# Patient Record
Sex: FEMALE | Race: WHITE | NOT HISPANIC OR LATINO | URBAN - METROPOLITAN AREA
[De-identification: names, ages, dates, MRNs, and addresses within clinical notes are randomized per-mention and may not be internally consistent; named-entity substitution may affect disease eponyms.]

---

## 2017-07-05 ENCOUNTER — OFFICE VISIT (OUTPATIENT)
Dept: NEUROLOGY | Facility: MEDICAL CENTER | Age: 27
End: 2017-07-05
Payer: COMMERCIAL

## 2017-07-05 VITALS
DIASTOLIC BLOOD PRESSURE: 58 MMHG | RESPIRATION RATE: 18 BRPM | SYSTOLIC BLOOD PRESSURE: 118 MMHG | HEART RATE: 99 BPM | OXYGEN SATURATION: 96 % | WEIGHT: 132 LBS | TEMPERATURE: 98.7 F

## 2017-07-05 DIAGNOSIS — G43.119 INTRACTABLE MIGRAINE WITH AURA WITHOUT STATUS MIGRAINOSUS: Primary | ICD-10-CM

## 2017-07-05 PROCEDURE — 99244 OFF/OP CNSLTJ NEW/EST MOD 40: CPT | Performed by: PSYCHIATRY & NEUROLOGY

## 2017-07-05 RX ORDER — METOCLOPRAMIDE 10 MG/1
TABLET ORAL
Qty: 45 TAB | Refills: 1 | Status: SHIPPED | OUTPATIENT
Start: 2017-07-05

## 2017-07-05 RX ORDER — SUMATRIPTAN 20 MG/1
SPRAY NASAL
Qty: 6 EACH | Refills: 1 | Status: SHIPPED | OUTPATIENT
Start: 2017-07-05

## 2017-07-05 RX ORDER — IBUPROFEN 800 MG/1
800 TABLET ORAL EVERY 8 HOURS PRN
COMMUNITY

## 2017-07-05 ASSESSMENT — ENCOUNTER SYMPTOMS
DEPRESSION: 0
SENSORY CHANGE: 0
TINGLING: 0
FOCAL WEAKNESS: 0
NECK PAIN: 0
MEMORY LOSS: 0
DOUBLE VISION: 0
HEADACHES: 1
INSOMNIA: 0

## 2017-07-05 ASSESSMENT — PAIN SCALES - GENERAL: PAINLEVEL: 2=MINIMAL-SLIGHT

## 2017-07-05 NOTE — MR AVS SNAPSHOT
Gilda Davis   2017 2:40 PM   Office Visit   MRN: [id number]    Department:  [facility]   Dept Phone:  [phone]    Description:  Female : 1990   Provider:  Jose Nolasco M.D.           Reason for Visit     New Patient headaches      Allergies as of 2017     Allergen Noted Reactions    Benzoyl Peroxide 2017       Rash      You were diagnosed with     Intractable migraine with aura without status migrainosus   [[id number]]  -  Primary       Vital Signs     Blood Pressure Pulse Temperature Respirations Weight Oxygen Saturation    118/58 mmHg 99 37.1 °C (98.7 °F) 18 59.875 kg (132 lb) 96%      Basic Information     Date Of Birth Sex Race Ethnicity Preferred Language    1990 Female White Non- English      Problem List              ICD-10-CM Priority Class Noted - Resolved    Intractable migraine with aura without status migrainosus G43.119   2017 - Present      Health Maintenance     Patient has no pending health maintenance at this time      Current Immunizations     No immunizations on file.      Below and/or attached are the medications your provider expects you to take. Review all of your home medications and newly ordered medications with your provider and/or pharmacist. Follow medication instructions as directed by your provider and/or pharmacist. Please keep your medication list with you and share with your provider. Update the information when medications are discontinued, doses are changed, or new medications (including over-the-counter products) are added; and carry medication information at all times in the event of emergency situations     Allergies:  BENZOYL PEROXIDE - (reactions not documented)               Medications  Valid as of: 2017 -  3:52 PM    Generic Name Brand Name Tablet Size Instructions for use    Ibuprofen (Tab) MOTRIN 800 MG Take 800 mg by mouth every 8 hours as needed.        Metoclopramide HCl (Tab) REGLAN 10 MG 1-3 tab at  headache/nausea onset; repeat in 4-6 hours prn        SUMAtriptan (Solution) IMITREX 20 MG/ACT First spray at headache pain onset; repeat in 1 hour prn.        .                 Medicines prescribed today were sent to:     [id number] - [place], [place] - [address]    [address] [place] [address]    Phone: [phone] Fax: [phone]    Open 24 Hours?: No      Medication refill instructions:       If your prescription bottle indicates you have medication refills left, it is not necessary to call your provider’s office. Please contact your pharmacy and they will refill your medication.    If your prescription bottle indicates you do not have any refills left, you may request refills at any time through one of the following ways: The online Smart GPS Backpack system (except Urgent Care), by calling your provider’s office, or by asking your pharmacy to contact your provider’s office with a refill request. Medication refills are processed only during regular business hours and may not be available until the next business day. Your provider may request additional information or to have a follow-up visit with you prior to refilling your medication.   *Please Note: Medication refills are assigned a new Rx number when refilled electronically. Your pharmacy may indicate that no refills were authorized even though a new prescription for the same medication is available at the pharmacy. Please request the medicine by name with the pharmacy before contacting your provider for a refill.           Smart GPS Backpack Access Code: [email or web address]  Expires: 8/4/2017  3:52 PM    Your email address is not on file at [facility].  Email Addresses are required for you to sign up for Smart GPS Backpack, please contact [phone] to verify your personal information and to provide your email address prior to attempting to register for Smart GPS Backpack.    [place] [address]    Smart GPS Backpack  A secure, online tool to manage your health  information     [employer] Puuilo® is a secure, online tool that connects you to your personalized health information from the privacy of your home -- day or night - making it very easy for you to manage your healthcare. Once the activation process is completed, you can even access your medical information using the Flooredt arthur, which is available for free in the Apple Arthur store or Google Play store.     To learn more about Puuilo, visit [email or web address]    There are two levels of access available (as shown below):   My Chart Features  [facility] Primary Care Doctor [facility]  Specialists [facility]  Urgent  Care [facility] Primary Care Doctor   Email your healthcare team securely and privately 24/7 X X X    Manage appointments: schedule your next appointment; view details of past/upcoming appointments X      Request prescription refills. X      View recent personal medical records, including lab and immunizations X X X X   View health record, including health history, allergies, medications X X X X   Read reports about your outpatient visits, procedures, consult and ER notes X X X X   See your discharge summary, which is a recap of your hospital and/or ER visit that includes your diagnosis, lab results, and care plan X X  X     How to register for Puuilo:  Once your e-mail address has been verified, follow the following steps to sign up for Puuilo.     1. Go to  [email or web address]  2. Click on the Sign Up Now box, which takes you to the New Member Sign Up page. You will need to provide the following information:  a. Enter your Puuilo Access Code exactly as it appears at the top of this page. (You will not need to use this code after you’ve completed the sign-up process. If you do not sign up before the expiration date, you must request a new code.)   b. Enter your date of birth.   c. Enter your home email address.   d. Click Submit, and follow the next screen’s instructions.  3. Create a Puuilo ID.  This will be your WeShow login ID and cannot be changed, so think of one that is secure and easy to remember.  4. Create a WeShow password. You can change your password at any time.  5. Enter your Password Reset Question and Answer. This can be used at a later time if you forget your password.   6. Enter your e-mail address. This allows you to receive e-mail notifications when new information is available in WeShow.  7. Click Sign Up. You can now view your health information.    For assistance activating your WeShow account, call [phone]

## 2017-07-06 NOTE — PROGRESS NOTES
Subjective:      Gilda Davis is a 27 y.o. female who presents with her  Maxwell, and her 8-month-old Javad, for consultation from the office of Dr. Christopher, with a history of migraine with aura.     HPI    Gilda is a pleasant 27 right-handed  female whose headaches started at about 13 years of age, and which have become a bit more problematic over the last several months.    Prodrome: None    Aura: Typically visual, lasting less than 10 minutes, she describes scintillations and photopsias. On one occasion recently, with this she actually had left-sided paresthesias and weakness as well as right hand tingling and limited mobility. Still, the symptoms lasted less than 10 minutes.    Headache: Unilateral, typically behind the eyes, there is nausea early on, heightened sensitivities, impaired concentration, as well as mild scalp hypersensitivity. There are no autonomic symptoms, she does not describe neck pain and stiffness.    Duration: Typically 2 days, these have lasted a little longer more recently.    Frequency: At her best they would occur once a month, typically menstrual, nowadays she is having upwards of at least 2 headache attacks on a weekly basis, giving her more than 20 days/month of pain, going on for at least 5 months.    Onset: Towards the end of the day    Start: Pre-menarche, maybe about 13 years of age    Triggers: Always 1-2 days prior to menstrual flow, she has not been able to identify other triggers. On birth control and the past, there was no change in the headaches.    Workup: None has been done to date    Treatment: Motrin 800 mg or Anaprox do provide good benefit. Given Imitrex, Maxalt and then Zomig tablets, these never provided relief, but she was also already nauseated and was vomiting when they were taken. She is presently on magnesium tablets. She and her  are considering another pregnancy, so she has real concerns about what to use moving forwards. She has never been on  maintenance therapies.    She has a history of exercised-induced asthma, her migraine headaches, otherwise there is no history of CAD, CVA, PVD, malignancy, glaucoma, kidney stones, liver or kidney disease, asthma, insomnia, psychiatric disease, blood dyscrasia or rheumatoid disease. There is no surgical history of note. Her menses are typically regular, occurring once a month with a 5 day duration. Her mother and everyone in her mother's family suffers from severe headaches, as do all of her 6 siblings. Her maternal grandmother had breast cancer, her father has hypertension. She does not smoke or drink. 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 Other than Motrin, she is on no other medications except prenatal vitamins.    Review of Systems   Constitutional: Negative for malaise/fatigue.   Eyes: Negative for double vision.   Musculoskeletal: Negative for neck pain.   Neurological: Positive for headaches. Negative for tingling, sensory change and focal weakness.   Psychiatric/Behavioral: Negative for depression and memory loss. The patient does not have insomnia.    All other systems reviewed and are negative.       Objective:     /58 mmHg  Pulse 99  Temp(Src) 37.1 °C (98.7 °F)  Resp 18  Wt 59.875 kg (132 lb)  SpO2 96%     Physical Exam    She appears in no acute distress. Her vital signs are stable. There is no malar rash, temporal or jaw tenderness, jaw claudication, or allodynia. The neck is supple, range of motion is full, carotid pulses are present bilaterally without asymmetry or bruits. Her cardiac evaluation reveals a regular rhythm without murmur. Straight leg raising is negative bilaterally, vascular exam reveals no evidence of lower extremity edema or vascular insufficiency changes.    Mental status exam is intact without evidence of focal cognitive or language deficit.    PERRLA/EOMI, visual fields are full to finger  counting, funduscopic exam reveals sharp disc margins, there is no facial asymmetry, sensory loss, or bulbar dysfunction. The tongue and uvula are midline. Jaw jerk is absent, shoulder shrug and head rotation are intact.    Motor exam reveals normal tone without tremor, asterixis, or drift. Strength is 5/5 bilaterally, reflexes are present at all points without asymmetries, none are dropped, both toes are downgoing.    Coordination reveals intact fine motor control and repetitive movements in all 4 extremities. There is no appendicular dystaxia. Heel, toe, and tandem walking are intact.    Sensory exam is intact to vibration and temperature, Romberg is absent.     Assessment/Plan:     1. Intractable migraine with aura without status migrainosus  She is suffering from rather intractable migraine, she fits criteria for chronic migraine given the frequency over the last 3 months. Regardless, with the pregnancy issues, maintenance therapies are relatively speaking contraindicated though beta blockers and calcium channel blockers can certainly be attempted. Even Botox is a legitimate treatment option but unfortunately she has to have failed at least 2 different maintenance therapies and different drug classes before this drug will be authorized. For now, we will start magnesium 500 mg, twice a day, riboflavin 400 mg daily, niacin 500 mg daily at bedtime and melatonin 6 mg daily at bedtime. She will need to be on these for several months to see if there is clinical benefit evolving. At least we know these are safe to use in pregnancy. For rescue, I think a parenteral medication such as a nasal spray, used with Reglan will more likely prove effective. Because the Triptans, as a class, have very short half-lives, they can be safer in this circumstance. Technique is critical for nasal spray efficacy, this was demonstrated in the office. Imitrex 20 mg nasal spray with Reglan 10 mg tablets will be used at aura onset, repeated  in one hour if needed. Side effects were reviewed. Phone contact in the interim, I can follow-up with her in 6 months otherwise. She will notify the office if she does in fact become pregnant.    - metoclopramide (REGLAN) 10 MG Tab; 1-3 tab at headache/nausea onset; repeat in 4-6 hours prn  Dispense: 45 Tab; Refill: 1  - sumatriptan (IMITREX) 20 MG/ACT nasal spray; First spray at headache pain onset; repeat in 1 hour prn.  Dispense: 6 Each; Refill: 1    Time: Evaluation 60 minutes for exam come review, discussion, and education  Discussion: As mentioned in the assessment, over 60% of the time spent face-to-face counseling and coordinating care